# Patient Record
Sex: MALE | URBAN - METROPOLITAN AREA
[De-identification: names, ages, dates, MRNs, and addresses within clinical notes are randomized per-mention and may not be internally consistent; named-entity substitution may affect disease eponyms.]

---

## 2018-06-24 ENCOUNTER — HOSPITAL ENCOUNTER (INPATIENT)
Facility: HOSPITAL | Age: 58
LOS: 2 days | Discharge: HOME | DRG: 246 | End: 2018-06-26
Attending: EMERGENCY MEDICINE | Admitting: INTERNAL MEDICINE
Payer: COMMERCIAL

## 2018-06-24 ENCOUNTER — APPOINTMENT (EMERGENCY)
Dept: NON INVASIVE DIAGNOSTICS | Facility: HOSPITAL | Age: 58
DRG: 246 | End: 2018-06-24
Attending: INTERNAL MEDICINE
Payer: COMMERCIAL

## 2018-06-24 ENCOUNTER — APPOINTMENT (EMERGENCY)
Dept: RADIOLOGY | Facility: HOSPITAL | Age: 58
DRG: 246 | End: 2018-06-24
Payer: COMMERCIAL

## 2018-06-24 DIAGNOSIS — I21.9 ACUTE MYOCARDIAL INFARCTION (HCC): ICD-10-CM

## 2018-06-24 DIAGNOSIS — I49.01 VENTRICULAR FIBRILLATION (HCC): ICD-10-CM

## 2018-06-24 DIAGNOSIS — I21.09 ST ELEVATION MYOCARDIAL INFARCTION (STEMI) INVOLVING OTHER CORONARY ARTERY OF ANTERIOR WALL (HCC): Primary | ICD-10-CM

## 2018-06-24 PROBLEM — I21.3 STEMI (ST ELEVATION MYOCARDIAL INFARCTION) (HCC): Status: ACTIVE | Noted: 2018-06-24

## 2018-06-24 LAB
ALBUMIN SERPL BCP-MCNC: 3.9 G/DL (ref 3.5–5)
ALP SERPL-CCNC: 53 U/L (ref 46–116)
ALT SERPL W P-5'-P-CCNC: 29 U/L (ref 12–78)
ANION GAP SERPL CALCULATED.3IONS-SCNC: 10 MMOL/L (ref 4–13)
AST SERPL W P-5'-P-CCNC: 23 U/L (ref 5–45)
BASOPHILS # BLD AUTO: 0.02 THOUSANDS/ΜL (ref 0–0.1)
BASOPHILS NFR BLD AUTO: 0 % (ref 0–1)
BILIRUB SERPL-MCNC: 0.37 MG/DL (ref 0.2–1)
BUN SERPL-MCNC: 22 MG/DL (ref 5–25)
CALCIUM SERPL-MCNC: 8.9 MG/DL (ref 8.3–10.1)
CHLORIDE SERPL-SCNC: 106 MMOL/L (ref 100–108)
CO2 SERPL-SCNC: 25 MMOL/L (ref 21–32)
CREAT SERPL-MCNC: 1.25 MG/DL (ref 0.6–1.3)
EOSINOPHIL # BLD AUTO: 0 THOUSAND/ΜL (ref 0–0.61)
EOSINOPHIL NFR BLD AUTO: 0 % (ref 0–6)
ERYTHROCYTE [DISTWIDTH] IN BLOOD BY AUTOMATED COUNT: 13.2 % (ref 11.6–15.1)
GFR SERPL CREATININE-BSD FRML MDRD: 64 ML/MIN/1.73SQ M
GLUCOSE SERPL-MCNC: 107 MG/DL (ref 65–140)
HCT VFR BLD AUTO: 41.9 % (ref 36.5–49.3)
HGB BLD-MCNC: 14.4 G/DL (ref 12–17)
LYMPHOCYTES # BLD AUTO: 1.09 THOUSANDS/ΜL (ref 0.6–4.47)
LYMPHOCYTES NFR BLD AUTO: 10 % (ref 14–44)
MCH RBC QN AUTO: 30.7 PG (ref 26.8–34.3)
MCHC RBC AUTO-ENTMCNC: 34.4 G/DL (ref 31.4–37.4)
MCV RBC AUTO: 89 FL (ref 82–98)
MONOCYTES # BLD AUTO: 0.86 THOUSAND/ΜL (ref 0.17–1.22)
MONOCYTES NFR BLD AUTO: 8 % (ref 4–12)
NEUTROPHILS # BLD AUTO: 9.55 THOUSANDS/ΜL (ref 1.85–7.62)
NEUTS SEG NFR BLD AUTO: 83 % (ref 43–75)
NRBC BLD AUTO-RTO: 0 /100 WBCS
PLATELET # BLD AUTO: 232 THOUSANDS/UL (ref 149–390)
PMV BLD AUTO: 11.5 FL (ref 8.9–12.7)
POTASSIUM SERPL-SCNC: 3.8 MMOL/L (ref 3.5–5.3)
PROT SERPL-MCNC: 6.7 G/DL (ref 6.4–8.2)
RBC # BLD AUTO: 4.69 MILLION/UL (ref 3.88–5.62)
SODIUM SERPL-SCNC: 141 MMOL/L (ref 136–145)
TROPONIN I SERPL-MCNC: 0.17 NG/ML
WBC # BLD AUTO: 11.52 THOUSAND/UL (ref 4.31–10.16)

## 2018-06-24 PROCEDURE — 99152 MOD SED SAME PHYS/QHP 5/>YRS: CPT | Performed by: INTERNAL MEDICINE

## 2018-06-24 PROCEDURE — C9606 PERC D-E COR REVASC W AMI S: HCPCS | Performed by: INTERNAL MEDICINE

## 2018-06-24 PROCEDURE — C1725 CATH, TRANSLUMIN NON-LASER: HCPCS | Performed by: INTERNAL MEDICINE

## 2018-06-24 PROCEDURE — 36415 COLL VENOUS BLD VENIPUNCTURE: CPT | Performed by: EMERGENCY MEDICINE

## 2018-06-24 PROCEDURE — C1894 INTRO/SHEATH, NON-LASER: HCPCS | Performed by: INTERNAL MEDICINE

## 2018-06-24 PROCEDURE — 99233 SBSQ HOSP IP/OBS HIGH 50: CPT | Performed by: NURSE PRACTITIONER

## 2018-06-24 PROCEDURE — C1887 CATHETER, GUIDING: HCPCS | Performed by: INTERNAL MEDICINE

## 2018-06-24 PROCEDURE — 71045 X-RAY EXAM CHEST 1 VIEW: CPT

## 2018-06-24 PROCEDURE — 92950 HEART/LUNG RESUSCITATION CPR: CPT

## 2018-06-24 PROCEDURE — C1874 STENT, COATED/COV W/DEL SYS: HCPCS

## 2018-06-24 PROCEDURE — 85025 COMPLETE CBC W/AUTO DIFF WBC: CPT | Performed by: EMERGENCY MEDICINE

## 2018-06-24 PROCEDURE — 84484 ASSAY OF TROPONIN QUANT: CPT | Performed by: EMERGENCY MEDICINE

## 2018-06-24 PROCEDURE — 96372 THER/PROPH/DIAG INJ SC/IM: CPT

## 2018-06-24 PROCEDURE — 96376 TX/PRO/DX INJ SAME DRUG ADON: CPT

## 2018-06-24 PROCEDURE — 93454 CORONARY ARTERY ANGIO S&I: CPT | Performed by: INTERNAL MEDICINE

## 2018-06-24 PROCEDURE — 80053 COMPREHEN METABOLIC PANEL: CPT | Performed by: EMERGENCY MEDICINE

## 2018-06-24 PROCEDURE — 96365 THER/PROPH/DIAG IV INF INIT: CPT

## 2018-06-24 PROCEDURE — 93005 ELECTROCARDIOGRAM TRACING: CPT

## 2018-06-24 PROCEDURE — 96368 THER/DIAG CONCURRENT INF: CPT

## 2018-06-24 PROCEDURE — 5A12012 PERFORMANCE OF CARDIAC OUTPUT, SINGLE, MANUAL: ICD-10-PCS | Performed by: EMERGENCY MEDICINE

## 2018-06-24 PROCEDURE — 5A2204Z RESTORATION OF CARDIAC RHYTHM, SINGLE: ICD-10-PCS | Performed by: EMERGENCY MEDICINE

## 2018-06-24 PROCEDURE — 92941 PRQ TRLML REVSC TOT OCCL AMI: CPT | Performed by: INTERNAL MEDICINE

## 2018-06-24 PROCEDURE — 027035Z DILATION OF CORONARY ARTERY, ONE ARTERY WITH TWO DRUG-ELUTING INTRALUMINAL DEVICES, PERCUTANEOUS APPROACH: ICD-10-PCS | Performed by: INTERNAL MEDICINE

## 2018-06-24 PROCEDURE — 99291 CRITICAL CARE FIRST HOUR: CPT

## 2018-06-24 PROCEDURE — B2111ZZ FLUOROSCOPY OF MULTIPLE CORONARY ARTERIES USING LOW OSMOLAR CONTRAST: ICD-10-PCS | Performed by: INTERNAL MEDICINE

## 2018-06-24 PROCEDURE — C1769 GUIDE WIRE: HCPCS | Performed by: INTERNAL MEDICINE

## 2018-06-24 PROCEDURE — 99153 MOD SED SAME PHYS/QHP EA: CPT | Performed by: INTERNAL MEDICINE

## 2018-06-24 PROCEDURE — 96375 TX/PRO/DX INJ NEW DRUG ADDON: CPT

## 2018-06-24 RX ORDER — MAGNESIUM SULFATE 500 MG/ML
VIAL (ML) INJECTION CODE/TRAUMA/SEDATION MEDICATION
Status: COMPLETED | OUTPATIENT
Start: 2018-06-24 | End: 2018-06-24

## 2018-06-24 RX ORDER — EPINEPHRINE 0.1 MG/ML
SYRINGE (ML) INJECTION CODE/TRAUMA/SEDATION MEDICATION
Status: COMPLETED | OUTPATIENT
Start: 2018-06-24 | End: 2018-06-24

## 2018-06-24 RX ORDER — ATORVASTATIN CALCIUM 40 MG/1
80 TABLET, FILM COATED ORAL EVERY EVENING
Status: DISCONTINUED | OUTPATIENT
Start: 2018-06-24 | End: 2018-06-26 | Stop reason: HOSPADM

## 2018-06-24 RX ORDER — DOPAMINE HYDROCHLORIDE 160 MG/100ML
INJECTION, SOLUTION INTRAVENOUS
Status: COMPLETED | OUTPATIENT
Start: 2018-06-24 | End: 2018-06-24

## 2018-06-24 RX ORDER — SODIUM CHLORIDE 9 MG/ML
INJECTION, SOLUTION INTRAVENOUS
Status: COMPLETED | OUTPATIENT
Start: 2018-06-24 | End: 2018-06-24

## 2018-06-24 RX ORDER — ASPIRIN 325 MG
325 TABLET, DELAYED RELEASE (ENTERIC COATED) ORAL DAILY
Status: DISCONTINUED | OUTPATIENT
Start: 2018-06-24 | End: 2018-06-26 | Stop reason: HOSPADM

## 2018-06-24 RX ORDER — FENTANYL CITRATE 50 UG/ML
INJECTION, SOLUTION INTRAMUSCULAR; INTRAVENOUS CODE/TRAUMA/SEDATION MEDICATION
Status: COMPLETED | OUTPATIENT
Start: 2018-06-24 | End: 2018-06-24

## 2018-06-24 RX ORDER — LIDOCAINE HYDROCHLORIDE 10 MG/ML
INJECTION, SOLUTION INFILTRATION; PERINEURAL CODE/TRAUMA/SEDATION MEDICATION
Status: COMPLETED | OUTPATIENT
Start: 2018-06-24 | End: 2018-06-24

## 2018-06-24 RX ORDER — SODIUM CHLORIDE 9 MG/ML
125 INJECTION, SOLUTION INTRAVENOUS CONTINUOUS
Status: DISCONTINUED | OUTPATIENT
Start: 2018-06-24 | End: 2018-06-26 | Stop reason: HOSPADM

## 2018-06-24 RX ORDER — LIDOCAINE HYDROCHLORIDE 20 MG/ML
INJECTION, SOLUTION EPIDURAL; INFILTRATION; INTRACAUDAL; PERINEURAL CODE/TRAUMA/SEDATION MEDICATION
Status: COMPLETED | OUTPATIENT
Start: 2018-06-24 | End: 2018-06-24

## 2018-06-24 RX ORDER — ATROPINE SULFATE 0.1 MG/ML
INJECTION, SOLUTION ENDOTRACHEAL; INTRAMUSCULAR; INTRAVENOUS; SUBCUTANEOUS CODE/TRAUMA/SEDATION MEDICATION
Status: COMPLETED | OUTPATIENT
Start: 2018-06-24 | End: 2018-06-24

## 2018-06-24 RX ORDER — ASPIRIN 325 MG
325 TABLET ORAL ONCE
Status: COMPLETED | OUTPATIENT
Start: 2018-06-25 | End: 2018-06-24

## 2018-06-24 RX ORDER — EPTIFIBATIDE 20 MG/10ML
INJECTION INTRAVENOUS CODE/TRAUMA/SEDATION MEDICATION
Status: COMPLETED | OUTPATIENT
Start: 2018-06-24 | End: 2018-06-24

## 2018-06-24 RX ORDER — HEPARIN SODIUM 5000 [USP'U]/ML
4000 INJECTION, SOLUTION INTRAVENOUS; SUBCUTANEOUS ONCE
Status: COMPLETED | OUTPATIENT
Start: 2018-06-24 | End: 2018-06-24

## 2018-06-24 RX ORDER — MIDAZOLAM HYDROCHLORIDE 1 MG/ML
INJECTION INTRAMUSCULAR; INTRAVENOUS CODE/TRAUMA/SEDATION MEDICATION
Status: COMPLETED | OUTPATIENT
Start: 2018-06-24 | End: 2018-06-24

## 2018-06-24 RX ADMIN — SODIUM CHLORIDE 125 ML/HR: 0.9 INJECTION, SOLUTION INTRAVENOUS at 15:05

## 2018-06-24 RX ADMIN — TICAGRELOR 90 MG: 90 TABLET ORAL at 12:47

## 2018-06-24 RX ADMIN — ATORVASTATIN CALCIUM 80 MG: 40 TABLET, FILM COATED ORAL at 18:39

## 2018-06-24 RX ADMIN — TICAGRELOR 90 MG: 90 TABLET ORAL at 13:04

## 2018-06-24 RX ADMIN — ATROPINE SULFATE 1 MG: 0.1 INJECTION, SOLUTION ENDOTRACHEAL; INTRAMUSCULAR; INTRAVENOUS; SUBCUTANEOUS at 13:43

## 2018-06-24 RX ADMIN — MIDAZOLAM 2 MG: 1 INJECTION INTRAMUSCULAR; INTRAVENOUS at 13:03

## 2018-06-24 RX ADMIN — FENTANYL CITRATE 50 MCG: 50 INJECTION, SOLUTION INTRAMUSCULAR; INTRAVENOUS at 13:54

## 2018-06-24 RX ADMIN — LIDOCAINE HYDROCHLORIDE 100 MG: 20 INJECTION, SOLUTION EPIDURAL; INFILTRATION; INTRACAUDAL; PERINEURAL at 12:31

## 2018-06-24 RX ADMIN — LIDOCAINE HYDROCHLORIDE 5 ML: 10 INJECTION, SOLUTION INFILTRATION; PERINEURAL at 13:05

## 2018-06-24 RX ADMIN — DOPAMINE HYDROCHLORIDE 5 MCG/KG/MIN: 160 INJECTION, SOLUTION INTRAVENOUS at 13:37

## 2018-06-24 RX ADMIN — EPTIFIBATIDE 7.3 ML: 2 INJECTION, SOLUTION INTRAVENOUS at 13:32

## 2018-06-24 RX ADMIN — FENTANYL CITRATE 50 MCG: 50 INJECTION, SOLUTION INTRAMUSCULAR; INTRAVENOUS at 13:02

## 2018-06-24 RX ADMIN — SODIUM CHLORIDE 200 ML/HR: 9 INJECTION, SOLUTION INTRAVENOUS at 13:29

## 2018-06-24 RX ADMIN — HEPARIN SODIUM 4000 UNITS: 5000 INJECTION, SOLUTION INTRAVENOUS; SUBCUTANEOUS at 12:34

## 2018-06-24 RX ADMIN — Medication 1.75 MG/KG/HR: at 13:17

## 2018-06-24 RX ADMIN — MAGNESIUM SULFATE HEPTAHYDRATE 2 G: 500 INJECTION, SOLUTION INTRAMUSCULAR; INTRAVENOUS at 12:33

## 2018-06-24 RX ADMIN — ASPIRIN 325 MG: 325 TABLET ORAL at 23:39

## 2018-06-24 RX ADMIN — TICAGRELOR 90 MG: 90 TABLET ORAL at 18:40

## 2018-06-24 RX ADMIN — ASPIRIN 325 MG: 325 TABLET, COATED ORAL at 15:37

## 2018-06-24 RX ADMIN — IOHEXOL 250 ML: 350 INJECTION, SOLUTION INTRAVENOUS at 13:56

## 2018-06-24 RX ADMIN — SODIUM CHLORIDE 125 ML/HR: 0.9 INJECTION, SOLUTION INTRAVENOUS at 22:01

## 2018-06-24 RX ADMIN — EPINEPHRINE 1 MG: 0.1 INJECTION, SOLUTION ENDOTRACHEAL; INTRACARDIAC; INTRAVENOUS at 12:26

## 2018-06-24 NOTE — CONSULTS
[facility] 56 y o  male MRN: [id number]  Unit/Bed#: ICU 07 Encounter: [id number]    Physician Requesting Consult: Dr Ned Trujilloer  Reason for Consult: Post V-Fib arrest medical management    Assessment/Plan:  1  Acute ST-elevation myocardial infarction with VFib arrest   · Status post ACLS, status post cardiac catheterization with stenting x2 to the ramus  Continue aspirin, Lipitor, Brilinta  Echocardiogram ordered  Continuous cardiopulmonary monitoring  Monitor troponins  Monitor right groin puncture site  Critical Care Time:   Documented critical care time excludes any procedures documented elsewhere  It also excludes any family updates    _____________________________________________________________________      HPI:    Ximena Fortune is a 56 y o  male who presents with acute myocardial infarction  Patient raced bicycles in his younger days but had not raced for 14 years  He returned to bicycle racing today and is visiting [place] secondary to a bicycle race  Post rates the patient had chest discomfort that did not resolve  He rated a 6/10 chest pain associated with diaphoresis, nausea, lightheadedness and dizziness  His friends convinced him to present to the emergency department [facility] for further evaluation treatment  Upon arrival to the emergency department any key G demonstrated a ST-elevation myocardial infarction pattern, and MI alert was called  After brief  The patient had a sudden change of mental status and developed ventricular fibrillation  ACLS protocol was initiated  He received 1 shock at 200 joules and this 1 mg of epinephrine IV with return of spontaneous circulation  He had subsequent runs of V-tach and was given lidocaine and magnesium  Upon my arrival to the emergency department patient was normal sinus rhythm  He continues to complain of 6/10 chest pain  He received 90 mg of Brilinta p o    Patient's blood pressure and rhythm was stable and therefore he was deemed fit to transport to the cardiac catheterization lab  Transport to the lab was uneventful and the patient was handed off to [address] [place] [address]  In occlusion of the ramus was found to stents were placed and the patient was returned to the intensive care unit for further monitoring  Patient is pain-free at this time without complaints  He is hemodynamically stable  His right groin insertion site is covered with a clean dry intact dressing  There is no evidence of hematoma, swelling or redness  He denies complaints of shortness of breath, nausea vomiting or dizziness  He denies abdominal pain  He denies lightheadedness or dizziness  He offers no further complaints    Review of Systems:    Full review of systems was performed  Aside from what was mentioned in the HPI, it is otherwise negative  Historical Information   History reviewed  No pertinent past medical history  Past Surgical History:   Procedure Laterality Date    HERNIA REPAIR       Social History   History   Alcohol Use    Yes     Comment: occasional     History   Drug Use No     History   Smoking Status    Never Smoker   Smokeless Tobacco    Never Used       Family History:   History reviewed  No pertinent family history  Medications:  Pertinent medications were reviewed    Current Facility-Administered Medications:  aspirin 325 mg Oral Daily Marti Robles MD    [START ON 6/25/2018] aspirin 325 mg Oral Once Marti Robles MD    atorvastatin 80 mg Oral QPM Marti Robles MD    sodium chloride 125 mL/hr Intravenous Continuous Marti Robles MD Last Rate: 125 mL/hr (06/24/18 1505)   ticagrelor 90 mg Oral BID Marti Robles MD          No Known Allergies      Vitals:   BP 96/78   Pulse 66   Temp 98 °F (36 7 °C) (Temporal)   Resp 18   Wt 88 6 kg (195 lb 5 2 oz)   SpO2 99%   There is no height or weight on file to calculate BMI    SpO2: 99 %,    ,   O2 Device: Nasal cannula      Intake/Output Summary (Last 24 hours) at 06/24/18 1543  Last data filed at 06/24/18 1505   Gross per 24 hour   Intake             1900 ml   Output                0 ml   Net             1900 ml     Invasive Devices     Peripheral Intravenous Line            Peripheral IV 06/24/18 Left Antecubital less than 1 day                Physical Exam:  Gen:  Pleasant and cooperative  HEENT:  Atraumatic normocephalic pupils equal round reactive to light extraocular muscles intact sclerae anicteric oral mucosa is pink and moist  Neck:  Supple no JVD no lymphadenopathy  Chest:  Clear to auscultation bilaterally respirations even and nonlabored  Cor:  Regular rate and rhythm no murmurs rubs or gallops appreciated  Abd:  Soft nontender with positive bowel sounds  Ext:  No clubbing cyanosis or edema  Neuro:  Alert and oriented x3 moves all extremities  Skin:  Warm dry and intact      Diagnostic Data:  Lab: I have personally reviewed pertinent lab results  ,   CBC:    Results from last 7 days  Lab Units 06/24/18  1220   WBC Thousand/uL 11 52*   HEMOGLOBIN g/dL 14 4   HEMATOCRIT % 41 9   PLATELETS Thousands/uL 232      CMP: Lab Results   Component Value Date     06/24/2018    K 3 8 06/24/2018     06/24/2018    CO2 25 06/24/2018    ANIONGAP 10 06/24/2018    BUN 22 06/24/2018    CREATININE 1 25 06/24/2018    GLUCOSE 107 06/24/2018    CALCIUM 8 9 06/24/2018    AST 23 06/24/2018    ALT 29 06/24/2018    ALKPHOS 53 06/24/2018    PROT 6 7 06/24/2018    BILITOT 0 37 06/24/2018    EGFR 64 06/24/2018   ,   PT/INR: No results found for: PT, INR,   Magnesium: No results found for: MAG,  Phosphorous: No results found for: PHOS    ABG: No results found for: PHART, DCV4BTX, PO2ART, DYL2QAM, B1WPRESW, BEART, SOURCE,     Microbiology:  No pending microbiology    Imaging: I have personally reviewed the pertinent imaging studies on the PACS system  Chest x-ray demonstrates no acute cardiopulmonary disease    Cardiac/EKG/telemetry/Echo:     Normal sinus rhythm      VTE Prophylaxis: Sequential compression device (Venodyne)  and Heparin    Code Status: Level 1 - Full Code    EZEQUIEL Collins    Portions of the record may have been created with voice recognition software  Occasional wrong word or "sound a like" substitutions may have occurred due to the inherent limitations of voice recognition software  Read the chart carefully and recognize, using context, where substitutions have occurred

## 2018-06-24 NOTE — H&P
H&P Exam - Cardiology   Jeannie Patel 56 y o  male MRN: [id number]  Unit/Bed#: TOM Encounter: [id number]    Assessment/Plan     AssessmentPlan:  1  Acute lateral wall myocardial infarction  Of this was confirmed by cardiac catheterization  There was total occlusion of a ramus intermedius with thrombus present  This was stented with 2 5 and 2 75 Xience drug-eluting stents to a maximum diameter of 3 mm with an excellent result  The patient is recommended to have dual anti-platelet therapy for 1 year and tight character lower and aspirin are ordered  A 2D echocardiogram is to be checked for left ventricular systolic function  He did have a soft systolic murmur and calcification of the aortic leaflets were noted on catheterization  The ascending  aorta also seen mildly enlarged based on catheter movement and I would recommend a CT scan of the chest at some point to rule out ascending aortic aneurysm  Fasting lipid profile will be checked and he will be started on atorvastatin  Beta-blockers will be held at this point secondary to relatively low blood pressure  This will be reassessed in the morning  History of Present Illness   HPI:  Jeannie Patel is a 56 y o  male who presents with acute substernal chest pain following a bike race  In the ED ST segment elevation noted in leads 1 and aVL and MI alert called  Upon arrival to cath lab the patient had continued 5 of 10 substernal chest pain  Historical Information   History reviewed  No pertinent past medical history  Past Surgical History:   Procedure Laterality Date    HERNIA REPAIR       Social History   History   Alcohol Use    Yes     Comment: occasional     History   Drug Use No     History   Smoking Status    Never Smoker   Smokeless Tobacco    Never Used     Family History: History reviewed  No pertinent family history      Meds/Allergies   all medications and allergies reviewed  No Known Allergies    Objective   Vitals: Blood pressure 96/70, pulse 69, temperature 97 5 °F (36 4 °C), temperature source Oral, resp  rate 18, weight 83 9 kg (185 lb), SpO2 98 %  Orthostatic Blood Pressures      Most Recent Value   Blood Pressure  96/70 filed at 06/24/2018 1248   Patient Position - Orthostatic VS  Lying filed at 06/24/2018 1213            Intake/Output Summary (Last 24 hours) at 06/24/18 1408  Last data filed at 06/24/18 1405   Gross per 24 hour   Intake             1700 ml   Output                0 ml   Net             1700 ml       Invasive Devices     Peripheral Intravenous Line            Peripheral IV 06/24/18 Left Antecubital less than 1 day    Peripheral IV 06/24/18 Right Antecubital less than 1 day                Review of Systems:  Review of Systems   Constitutional: Negative for fatigue  HENT: Negative for nosebleeds  Eyes: Negative for redness  Respiratory: Negative for chest tightness and shortness of breath  Cardiovascular: Positive for chest pain  Negative for palpitations and leg swelling  Gastrointestinal: Negative for abdominal pain  Endocrine: Negative for polyuria  Genitourinary: Negative for urgency  Musculoskeletal: Negative for arthralgias  Skin: Negative for rash  Neurological: Negative for dizziness and syncope  Psychiatric/Behavioral: Negative for confusion and sleep disturbance  The patient is not nervous/anxious  Physical Exam   Constitutional: He is oriented to person, place, and time  He appears well-developed and well-nourished  HENT:   Head: Normocephalic and atraumatic  Nose: Nose normal    Mouth/Throat: Oropharynx is clear and moist    Eyes: Pupils are equal, round, and reactive to light  Neck: Neck supple  Cardiovascular: Normal rate, regular rhythm and intact distal pulses  Murmur heard  Systolic murmur is present with a grade of 1/6   Pulmonary/Chest: Effort normal and breath sounds normal    Abdominal: Soft   Bowel sounds are normal    Musculoskeletal: Normal range of motion  Neurological: He is alert and oriented to person, place, and time  Skin: Skin is warm and dry  Psychiatric: He has a normal mood and affect  His behavior is normal  Judgment and thought content normal        Lab Results: I have personally reviewed pertinent lab results  ** Please Note: Fluency DirectDictation voice to text software may have been used in the creation of this document   **

## 2018-06-24 NOTE — BRIEF OP NOTE (RAD/CATH)
Cardiac catheterization    Indications:  MI alert  ST segment elevation 1 and aVL  Findings:  Single-vessel obstructive epicardial coronary artery disease with total acute occlusion of a large ramus intermedius proximally  The left main left anterior descending left circumflex and right coronary artery had mild luminal irregularities with no focal obstruction  The ramus was subsequently stented with a 2 5 by 18 Xience drug-eluting stent that was post dilated with a 2 75 NC balloon and more distally a 2 75 x 8 mm Xience drug-eluting stent was placed  Of the lesion had a significant thrombus with some mild distal embolization  In addition to dual anti platelet therapy and Angiomax the patient was also given double bolus of Integrilin  Following the procedure there was an excellent angiographic result with improvement in flow from AMANDA grade 0 to AMANDA grade 3  Recommendations:  A 2D echocardiogram for left ventricular function  The aortic leaflets were noted to be calcified and the ascending aorta seemed enlarged this can be assessed by echocardiography and if need be a CT scan can be ordered to confirm the size of the ascending aorta  He will be started on statin  Beta-blocker will be held for now secondary to relatively low systolic blood pressure and heart rate and this can be reassessed tomorrow morning    Dual anti-platelet therapy is recommended for 1 year

## 2018-06-24 NOTE — ED NOTES
Patient transported to the cath lab with RN and critical care team  Patient stable throughout transport, sinus rhythm throughout transport  Report given to cath lab team and care transferred        Eber Zavaleta RN  06/24/18 406 Quan Kate RN  06/24/18 0516

## 2018-06-25 ENCOUNTER — APPOINTMENT (INPATIENT)
Dept: NON INVASIVE DIAGNOSTICS | Facility: HOSPITAL | Age: 58
DRG: 246 | End: 2018-06-25
Payer: COMMERCIAL

## 2018-06-25 LAB
ANION GAP SERPL CALCULATED.3IONS-SCNC: 9 MMOL/L (ref 4–13)
ATRIAL RATE: 182 BPM
ATRIAL RATE: 60 BPM
BUN SERPL-MCNC: 12 MG/DL (ref 5–25)
CALCIUM SERPL-MCNC: 8.3 MG/DL (ref 8.3–10.1)
CHLORIDE SERPL-SCNC: 109 MMOL/L (ref 100–108)
CHOLEST SERPL-MCNC: 172 MG/DL (ref 50–200)
CO2 SERPL-SCNC: 24 MMOL/L (ref 21–32)
CREAT SERPL-MCNC: 0.87 MG/DL (ref 0.6–1.3)
ERYTHROCYTE [DISTWIDTH] IN BLOOD BY AUTOMATED COUNT: 13.7 % (ref 11.6–15.1)
GFR SERPL CREATININE-BSD FRML MDRD: 97 ML/MIN/1.73SQ M
GLUCOSE SERPL-MCNC: 102 MG/DL (ref 65–140)
HCT VFR BLD AUTO: 40.1 % (ref 36.5–49.3)
HDLC SERPL-MCNC: 54 MG/DL (ref 40–60)
HGB BLD-MCNC: 13.5 G/DL (ref 12–17)
LDLC SERPL CALC-MCNC: 103 MG/DL (ref 0–100)
MCH RBC QN AUTO: 30.5 PG (ref 26.8–34.3)
MCHC RBC AUTO-ENTMCNC: 33.7 G/DL (ref 31.4–37.4)
MCV RBC AUTO: 91 FL (ref 82–98)
NONHDLC SERPL-MCNC: 118 MG/DL
P AXIS: 69 DEGREES
P AXIS: 72 DEGREES
PLATELET # BLD AUTO: 195 THOUSANDS/UL (ref 149–390)
PMV BLD AUTO: 12 FL (ref 8.9–12.7)
POTASSIUM SERPL-SCNC: 3.7 MMOL/L (ref 3.5–5.3)
PR INTERVAL: 162 MS
QRS AXIS: 268 DEGREES
QRS AXIS: 52 DEGREES
QRSD INTERVAL: 138 MS
QRSD INTERVAL: 92 MS
QT INTERVAL: 288 MS
QT INTERVAL: 414 MS
QTC INTERVAL: 414 MS
QTC INTERVAL: 498 MS
RBC # BLD AUTO: 4.42 MILLION/UL (ref 3.88–5.62)
SODIUM SERPL-SCNC: 142 MMOL/L (ref 136–145)
T WAVE AXIS: -6 DEGREES
T WAVE AXIS: 18 DEGREES
TRIGL SERPL-MCNC: 76 MG/DL
TROPONIN I SERPL-MCNC: >40 NG/ML
TROPONIN I SERPL-MCNC: >40 NG/ML
VENTRICULAR RATE: 180 BPM
VENTRICULAR RATE: 60 BPM
WBC # BLD AUTO: 9.95 THOUSAND/UL (ref 4.31–10.16)

## 2018-06-25 PROCEDURE — 93010 ELECTROCARDIOGRAM REPORT: CPT | Performed by: INTERNAL MEDICINE

## 2018-06-25 PROCEDURE — 85027 COMPLETE CBC AUTOMATED: CPT | Performed by: INTERNAL MEDICINE

## 2018-06-25 PROCEDURE — 80061 LIPID PANEL: CPT | Performed by: INTERNAL MEDICINE

## 2018-06-25 PROCEDURE — 84484 ASSAY OF TROPONIN QUANT: CPT | Performed by: INTERNAL MEDICINE

## 2018-06-25 PROCEDURE — 93306 TTE W/DOPPLER COMPLETE: CPT

## 2018-06-25 PROCEDURE — 99232 SBSQ HOSP IP/OBS MODERATE 35: CPT | Performed by: INTERNAL MEDICINE

## 2018-06-25 PROCEDURE — 80048 BASIC METABOLIC PNL TOTAL CA: CPT | Performed by: INTERNAL MEDICINE

## 2018-06-25 PROCEDURE — 93306 TTE W/DOPPLER COMPLETE: CPT | Performed by: INTERNAL MEDICINE

## 2018-06-25 RX ORDER — ONDANSETRON 2 MG/ML
INJECTION INTRAMUSCULAR; INTRAVENOUS
Status: COMPLETED
Start: 2018-06-25 | End: 2018-06-25

## 2018-06-25 RX ADMIN — ASPIRIN 325 MG: 325 TABLET, COATED ORAL at 09:20

## 2018-06-25 RX ADMIN — ATORVASTATIN CALCIUM 80 MG: 40 TABLET, FILM COATED ORAL at 17:29

## 2018-06-25 RX ADMIN — ONDANSETRON 4 MG: 2 INJECTION INTRAMUSCULAR; INTRAVENOUS at 04:39

## 2018-06-25 RX ADMIN — TICAGRELOR 90 MG: 90 TABLET ORAL at 17:30

## 2018-06-25 RX ADMIN — SODIUM CHLORIDE 125 ML/HR: 0.9 INJECTION, SOLUTION INTRAVENOUS at 05:32

## 2018-06-25 RX ADMIN — TICAGRELOR 90 MG: 90 TABLET ORAL at 09:20

## 2018-06-25 NOTE — CASE MANAGEMENT
Initial Clinical Review    Admission: Date/Time/Statement: 6/24/18 @ 1406     Orders Placed This Encounter   Procedures    Inpatient Admission     Standing Status:   Standing     Number of Occurrences:   1     Order Specific Question:   Admitting Physician     Answer:   John Keith     Order Specific Question:   Level of Care     Answer:   Critical Care [15]     Order Specific Question:   Estimated length of stay     Answer:   More than 2 Midnights     Order Specific Question:   Certification     Answer:   I certify that inpatient services are medically necessary for this patient for a duration of greater than two midnights  See H&P and MD Progress Notes for additional information about the patient's course of treatment  ED: Date/Time/Mode of Arrival:   ED Arrival Information     Expected Arrival Acuity Means of Arrival Escorted By Service Admission Type    - 6/24/2018 12:08 Emergent Walk-In Self Cardiology Emergency    Arrival Complaint    chest pain          Chief Complaint:   Chief Complaint   Patient presents with    Chest Pain     chest pain x 1 hour  completed 24 mile bike race this morning  denies CP during the race  denies nausea  reports dizziness  History of Illness: Joel Ramírez is a 56 y o  male who presents with acute substernal chest pain following a bike race  In the ED ST segment elevation noted in leads 1 and aVL and MI alert called    Upon arrival to cath lab the patient had continued 5 of 10 substernal chest pain        ED Vital Signs:   ED Triage Vitals [06/24/18 1213]   Temperature Pulse Respirations Blood Pressure SpO2   97 5 °F (36 4 °C) 63 18 119/83 98 %      Temp Source Heart Rate Source Patient Position - Orthostatic VS BP Location FiO2 (%)   Oral Monitor Lying Right arm --      Pain Score       6        Wt Readings from Last 1 Encounters:   06/24/18 88 6 kg (195 lb 5 2 oz)       Vital Signs (abnormal):    above    Abnormal Labs/Diagnostic Test Results: Troponin  0 17  WBC   11 52  Abs neutro   9 55  CXR:  NAD    ED Treatment:   Medication Administration from 06/24/2018 1208 to 06/24/2018 1415       Date/Time Order Dose Route Action Action by Comments     06/24/2018 1226 EPINEPHrine (ADRENALIN) 1 mg/10 mL injection 1 mg Intravenous Given Jasper Phoenix RN      06/24/2018 1233 magnesium sulfate 1 g/2 mL injection 2 g Intravenous Given Jasper Phoenix RN      06/24/2018 1247 ticagrelor (BRILINTA) tablet 90 mg 90 mg Oral Given Winnie Roblero RN      06/24/2018 1234 heparin (porcine) subcutaneous injection 4,000 Units 4,000 Units Subcutaneous Given Winnie Roblero RN      06/24/2018 1302 fentanyl citrate (PF) 100 MCG/2ML 50 mcg Intravenous Given Donna Wolf RN      06/24/2018 1303 midazolam (VERSED) injection 2 mg Intravenous Given Donna Wolf RN      06/24/2018 1304 ticagrelor (BRILINTA) tablet 90 mg Oral Given Donna Wolf RN      06/24/2018 1305 lidocaine (XYLOCAINE) 1 % injection 5 mL Infiltration Given Eddie Swanson MD right groin     06/24/2018 1231 lidocaine (PF) (XYLOCAINE-MPF) 100 mg/5 mL injection 100 mg Intravenous Given Jasper Phoenix RN      06/24/2018 1315 bivalirudin (ANGIOMAX) bolus from bag 14 3 mL Intravenous Given Donna Wolf RN      06/24/2018 1400 Bivalirudin Trifluoroacetate (ANGIOMAX) 250 mg in sodium chloride 0 9 % 50 mL infusion 0 mg/kg/hr Intravenous Stopped Remy Fernández RN stopped prior to coming to the floor     06/24/2018 1317 Bivalirudin Trifluoroacetate (ANGIOMAX) 250 mg in sodium chloride 0 9 % 50 mL infusion 1 75 mg/kg/hr Intravenous [address] Donna Wolf RN      06/24/2018 1405 sodium chloride 0 9 % infusion 200 mL/hr  Rate/Dose Change Sarkis Stahl RN      06/24/2018 1329 sodium chloride 0 9 % infusion 200 mL/hr Intravenous New Bag Donna Wolf RN fluid open for sbp 80's     06/24/2018 1332 eptifibatide (INTEGRILIN) 20 MG/10ML (premix) 7 3 mL Intravenous Given Donna Wolf RN 06/24/2018 1354 DOPamine (INTROPIN) 400 mg in 250 mL infusion (premix)   Intravenous Stopped Abdoul Bearded, RN      06/24/2018 1345 DOPamine (INTROPIN) 400 mg in 250 mL infusion (premix) 5 mcg/kg/min Intravenous Rate/Dose Change Abdoul Bearded, RN      06/24/2018 1342 DOPamine (INTROPIN) 400 mg in 250 mL infusion (premix) 10 mcg/kg/min Intravenous Rate/Dose Change Abdoul Bearded, RN      06/24/2018 1337 DOPamine (INTROPIN) 400 mg in 250 mL infusion (premix) 5 mcg/kg/min Intravenous [address] Abdoul Bearded, RN      06/24/2018 1343 atropine 1 mg/10 mL injection 1 mg Intravenous Given Abdoul Bearded, RN      06/24/2018 1354 fentanyl citrate (PF) 100 MCG/2ML 50 mcg Intravenous Given Abdoul Bearded, RN      06/24/2018 1356 iohexol (OMNIPAQUE) 350 MG/ML injection (SINGLE-DOSE) 250 mL Intravenous Given Anam Serrano MD           Past Medical/Surgical History: Active Ambulatory Problems     Diagnosis Date Noted    No Active Ambulatory Problems     Resolved Ambulatory Problems     Diagnosis Date Noted    No Resolved Ambulatory Problems     No Additional Past Medical History       Admitting Diagnosis: Ventricular fibrillation ([facility] [address] ) [I49 01]  Acute myocardial infarction [facility]) [I21 9]  Chest pain [R07 9]    Age/Sex: 56 y o  male    Assessment/Plan: Acute lateral wall myocardial infarction  Of this was confirmed by cardiac catheterization  There was total occlusion of a ramus intermedius with thrombus present  This was stented with 2 5 and 2 75 Xience drug-eluting stents to a maximum diameter of 3 mm with an excellent result  The patient is recommended to have dual anti-platelet therapy for 1 year and tight character lower and aspirin are ordered  A 2D echocardiogram is to be checked for left ventricular systolic function  He did have a soft systolic murmur and calcification of the aortic leaflets were noted on catheterization    The ascending  aorta also seen mildly enlarged based on catheter movement and I would recommend a CT scan of the chest at some point to rule out ascending aortic aneurysm  Fasting lipid profile will be checked and he will be started on atorvastatin  Beta-blockers will be held at this point secondary to relatively low blood pressure  This will be reassessed in the morning        Admission Orders:   IP   6/24  @   1406  Scheduled Meds:   Current Facility-Administered Medications:  aspirin 325 mg Oral Daily Maximino Cyr MD    atorvastatin 80 mg Oral QPM Maximino Cyr MD    sodium chloride 125 mL/hr Intravenous Continuous Maximino Cyr MD Last Rate: 125 mL/hr (06/25/18 0532)   ticagrelor 90 mg Oral BID Maximino Cyr MD      Continuous Infusions:   sodium chloride 125 mL/hr Last Rate: 125 mL/hr (06/25/18 0532)     PRN Meds:     2 De  Cardiac  Diet  Serial troponin  Cons critical care  Cardiac   Cath  6/24    1  Acute ST-elevation myocardial infarction with VFib arrest   · Status post ACLS, status post cardiac catheterization with stenting x2 to the ramus  Continue aspirin, Lipitor, Brilinta  Echocardiogram ordered  Continuous cardiopulmonary monitoring  Monitor troponins  Monitor right groin puncture site  Thank you,  [facility] in the [employer] by Shaquille Olvera for 2017  Network Utilization Review Department  Phone: [phone]; Fax [phone]  ATTENTION: The Network Utilization Review Department is now centralized for our 7 Facilities  Make a note that we have a new phone and fax numbers for our Department  Please call with any questions or concerns to [phone] and carefully follow the prompts so that you are directed to the right person  All voicemails are confidential  Fax any determinations, approvals, denials, and requests for initial or continue stay review clinical to [phone]   Due to HIGH CALL volume, it would be easier if you could please send faxed requests to expedite your requests and in part, help us provide discharge notifications faster

## 2018-06-25 NOTE — PROGRESS NOTES
Progress Note - Cardiology   Joey William 56 y o  male MRN: [id number]  Unit/Bed#: ICU 07 Encounter: [id number]      Assessment/Recommendations/Discussion:     1  Acute lateral wall STEMI--s/p PCI/LIBRADO x 2 ramus (2 5 x 18 mm and 2 5 x 8mm)--good results, AMANDA 3 flow  No residual obstructive disease  2  VF arrest (brief) in ED secondary to above s/p defibrillation and brief CPR x 2 minutes  3  Dyslipidemia  4  AV calcification and ?aortic root dilatation on cath    · Continue dual antiplatelet treatment  · Continue high-dose statin  · Discontinue IV fluids after this current bag  Encouraged hydration today  · Incentive spirometer  · Will keep in ICU today, and transfer tomorrow if all is well  · Echocardiogram pending--if ascending AO poorly visualized, may consider CT chest       Subjective:  Patient seen and examined, feels well, some sharp chest discomfort with deep inspiration  Physical Exam:  GEN:  NAD  HEENT:  MMM, NCAT, pink conjunctiva, EOMI, nonicteric sclera  CV:  NO JVD/HJR, RR, NO M/R/G, +S1/S2, NO PARASTERNAL HEAVE/THRILL, NO LE EDEMA, NO HEPATIC SYSTOLIC PULSATION, WARM EXTREMITIES    Right groin looks clean, dry, intact without hematoma with normal distal pulses  RESP:  CTAB/L  ABD:  SOFT, NT, NO GROSS ORGANOMEGALY        Vitals:   BP 91/60   Pulse (!) 54   Temp 100 °F (37 8 °C) (Temporal)   Resp 21   Wt 88 6 kg (195 lb 5 2 oz)   SpO2 93%   Vitals:    06/24/18 1213 06/24/18 1451   Weight: 83 9 kg (185 lb) 88 6 kg (195 lb 5 2 oz)       Intake/Output Summary (Last 24 hours) at 06/25/18 1029  Last data filed at 06/25/18 0600   Gross per 24 hour   Intake          3760 42 ml   Output              750 ml   Net          3010 42 ml         TELEMETRY:  Sinus rhythm, nonsustained ventricular tachycardia  Lab Results:    Results from last 7 days  Lab Units 06/25/18  0530   WBC Thousand/uL 9 95   HEMOGLOBIN g/dL 13 5   HEMATOCRIT % 40 1   PLATELETS Thousands/uL 195       Results from last 7 days  Lab Units 06/25/18  0530 06/24/18  1220   SODIUM mmol/L 142 141   POTASSIUM mmol/L 3 7 3 8   CHLORIDE mmol/L 109* 106   CO2 mmol/L 24 25   BUN mg/dL 12 22   CREATININE mg/dL 0 87 1 25   CALCIUM mg/dL 8 3 8 9   TOTAL PROTEIN g/dL  --  6 7   BILIRUBIN TOTAL mg/dL  --  0 37   ALK PHOS U/L  --  53   ALT U/L  --  29   AST U/L  --  23   GLUCOSE RANDOM mg/dL 102 107       Results from last 7 days  Lab Units 06/25/18  0530   SODIUM mmol/L 142   POTASSIUM mmol/L 3 7   CHLORIDE mmol/L 109*   CO2 mmol/L 24   BUN mg/dL 12   CREATININE mg/dL 0 87   GLUCOSE RANDOM mg/dL 102   CALCIUM mg/dL 8 3       Results from last 7 days  Lab Units 06/25/18  0530   CHOLESTEROL mg/dL 172         Medications:    Current Facility-Administered Medications:     aspirin (ECOTRIN) EC tablet 325 mg, 325 mg, Oral, Daily, Ute Dangelo MD, 325 mg at 06/25/18 0920    atorvastatin (LIPITOR) tablet 80 mg, 80 mg, Oral, QPM, Ute Dangelo MD, 80 mg at 06/24/18 1839    sodium chloride 0 9 % infusion, 125 mL/hr, Intravenous, Continuous, Ute Dangelo MD, Last Rate: 125 mL/hr at 06/25/18 0532, 125 mL/hr at 06/25/18 0532    ticagrelor (BRILINTA) tablet 90 mg, 90 mg, Oral, BID, Ute Dangelo MD, 90 mg at 06/25/18 0920    Portions of the record may have been created with voice recognition software  Occasional wrong word or "sound a like" substitutions may have occurred due to the inherent limitations of voice recognition software  Read the chart carefully and recognize, using context, where substitutions have occurred

## 2018-06-25 NOTE — PLAN OF CARE

## 2018-06-25 NOTE — SOCIAL WORK
ADRIAN met with the patient and his wife to do a general SW assessment  The patient lives at home with his wife and two teenage children  He is independent with adls and ambulation  He drives  He is employed  No hx of VNA or STR needs in the past  He is an active cyclist      We did not have his insurance on file, he does not have his card but his wife had a statement in her purse Yuval Vaughan Mal warren  Member# [id number]  Group# [id number]  Telephone nUmber: [phone]  ADRIAN provided this information to  as well as the business office and financial counselor  Wife is [place] [phone]  Adrian does not anticipate any discharge needs

## 2018-06-26 ENCOUNTER — APPOINTMENT (INPATIENT)
Dept: CT IMAGING | Facility: HOSPITAL | Age: 58
DRG: 246 | End: 2018-06-26
Payer: COMMERCIAL

## 2018-06-26 VITALS
TEMPERATURE: 98.7 F | WEIGHT: 195.33 LBS | RESPIRATION RATE: 27 BRPM | DIASTOLIC BLOOD PRESSURE: 85 MMHG | HEART RATE: 66 BPM | SYSTOLIC BLOOD PRESSURE: 108 MMHG | OXYGEN SATURATION: 96 %

## 2018-06-26 PROBLEM — I77.810 AORTIC ROOT DILATATION (HCC): Status: ACTIVE | Noted: 2018-06-26

## 2018-06-26 PROBLEM — Q23.1 BICUSPID AORTIC VALVE: Status: ACTIVE | Noted: 2018-06-26

## 2018-06-26 PROBLEM — I49.01 VF (VENTRICULAR FIBRILLATION) (HCC): Status: ACTIVE | Noted: 2018-06-26

## 2018-06-26 PROBLEM — I25.10 CORONARY ARTERY DISEASE INVOLVING NATIVE CORONARY ARTERY OF NATIVE HEART: Chronic | Status: ACTIVE | Noted: 2018-06-26

## 2018-06-26 LAB
ANION GAP SERPL CALCULATED.3IONS-SCNC: 9 MMOL/L (ref 4–13)
BUN SERPL-MCNC: 11 MG/DL (ref 5–25)
CALCIUM SERPL-MCNC: 8 MG/DL (ref 8.3–10.1)
CHLORIDE SERPL-SCNC: 109 MMOL/L (ref 100–108)
CO2 SERPL-SCNC: 24 MMOL/L (ref 21–32)
CREAT SERPL-MCNC: 0.99 MG/DL (ref 0.6–1.3)
GFR SERPL CREATININE-BSD FRML MDRD: 85 ML/MIN/1.73SQ M
GLUCOSE SERPL-MCNC: 90 MG/DL (ref 65–140)
NT-PROBNP SERPL-MCNC: 1958 PG/ML
POTASSIUM SERPL-SCNC: 4.1 MMOL/L (ref 3.5–5.3)
SODIUM SERPL-SCNC: 142 MMOL/L (ref 136–145)

## 2018-06-26 PROCEDURE — 80048 BASIC METABOLIC PNL TOTAL CA: CPT | Performed by: INTERNAL MEDICINE

## 2018-06-26 PROCEDURE — 83880 ASSAY OF NATRIURETIC PEPTIDE: CPT | Performed by: INTERNAL MEDICINE

## 2018-06-26 PROCEDURE — 71260 CT THORAX DX C+: CPT

## 2018-06-26 PROCEDURE — 99238 HOSP IP/OBS DSCHRG MGMT 30/<: CPT | Performed by: INTERNAL MEDICINE

## 2018-06-26 RX ORDER — NITROGLYCERIN 0.4 MG/1
0.4 TABLET SUBLINGUAL
Qty: 45 TABLET | Refills: 1 | Status: SHIPPED | OUTPATIENT
Start: 2018-06-26

## 2018-06-26 RX ORDER — ATORVASTATIN CALCIUM 80 MG/1
80 TABLET, FILM COATED ORAL DAILY
Qty: 30 TABLET | Refills: 11 | Status: SHIPPED | OUTPATIENT
Start: 2018-06-26

## 2018-06-26 RX ORDER — ASPIRIN 81 MG/1
81 TABLET ORAL DAILY
Qty: 30 TABLET | Refills: 0
Start: 2018-06-26

## 2018-06-26 RX ADMIN — IOHEXOL 85 ML: 350 INJECTION, SOLUTION INTRAVENOUS at 10:43

## 2018-06-26 RX ADMIN — ATORVASTATIN CALCIUM 80 MG: 40 TABLET, FILM COATED ORAL at 15:44

## 2018-06-26 RX ADMIN — TICAGRELOR 90 MG: 90 TABLET ORAL at 08:36

## 2018-06-26 RX ADMIN — TICAGRELOR 90 MG: 90 TABLET ORAL at 15:44

## 2018-06-26 RX ADMIN — ASPIRIN 325 MG: 325 TABLET, COATED ORAL at 08:36
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
## 2018-06-26 NOTE — DISCHARGE INSTRUCTIONS
Myocardial Infarction   WHAT YOU NEED TO KNOW:   A myocardial infarction (MI) is a heart attack  A heart attack happens when the blood vessels that supply blood to your heart (coronary arteries) are blocked  This can damage your heart  It can lead to an abnormal heart rhythm, heart failure, or may become life-threatening  DISCHARGE INSTRUCTIONS:   Medicines: You may  need any of the following:  · Heart medicines  help decrease blood pressure, control your heart rate, and help your heart function better  · Nitroglycerin  opens the arteries to your heart, increases oxygen levels, and can decrease chest pain  You may get your nitroglycerin as a pill, a patch, or a paste  Ask your healthcare provider or cardiologist how to safely take this medicine  · Aspirin  helps prevent clots from forming and causing blood flow problems  If healthcare providers want you to take aspirin daily, do not take acetaminophen or ibuprofen instead  Do not take more or less aspirin than healthcare providers say to take  If you are on another blood thinner medicine, ask your healthcare provider or cardiologist before you take aspirin for any reason  · Blood thinners    help prevent blood clots  Examples of blood thinners include heparin and warfarin  Clots can cause strokes, heart attacks, and death  The following are general safety guidelines to follow while you are taking a blood thinner:    ¨ Watch for bleeding and bruising while you take blood thinners  Watch for bleeding from your gums or nose  Watch for blood in your urine and bowel movements  Use a soft washcloth on your skin, and a soft toothbrush to brush your teeth  This can keep your skin and gums from bleeding  If you shave, use an electric shaver  Do not play contact sports  ¨ Tell your dentist and other healthcare providers that you take anticoagulants  Wear a bracelet or necklace that says you take this medicine       ¨ Do not start or stop any medicines unless your healthcare provider tells you to  Many medicines cannot be used with blood thinners  ¨ Tell your healthcare provider right away if you forget to take the medicine, or if you take too much  ¨ Warfarin  is a blood thinner that you may need to take  The following are things you should be aware of if you take warfarin  § Foods and medicines can affect the amount of warfarin in your blood  Do not make major changes to your diet while you take warfarin  Warfarin works best when you eat about the same amount of vitamin K every day  Vitamin K is found in green leafy vegetables and certain other foods  Ask for more information about what to eat when you are taking warfarin  § You will need to see your healthcare provider for follow-up visits when you are on warfarin  You will need regular blood tests  These tests are used to decide how much medicine you need  · Cholesterol medicine  decreases cholesterol and the amount of plaque in your blood  · Do not take certain medicines without asking your healthcare provider first   These include NSAIDs, herbal or vitamin supplements, or hormones (estrogen or progestin)  · Take your medicine as directed  Contact your healthcare provider if you think your medicine is not helping or if you have side effects  Tell him or her if you are allergic to any medicine  Keep a list of the medicines, vitamins, and herbs you take  Include the amounts, and when and why you take them  Bring the list or the pill bottles to follow-up visits  Carry your medicine list with you in case of an emergency  Cardiac rehabilitation (rehab)  is a program run by specialists who will help you safely strengthen your heart and prevent more heart disease  The plan includes exercise, relaxation, stress management, and heart-healthy nutrition  Healthcare providers will also check to make sure any medicines you take are working   The plan may also include instructions for when you can drive, return to work, and do other normal daily activities  Follow up with your healthcare provider or cardiologist within 14 days or as directed:  Ask for information about continuing care, treatments, and home services  Write down your questions so you remember to ask them during your visits  Lifestyle changes:   · Go to cardiac rehabilitation as directed  This is a program run by specialists who will help you safely strengthen your heart and prevent more heart disease  This plan includes exercise, relaxation, stress management, and heart-healthy nutrition  Healthcare providers will also check to make sure any medicines you are taking are working  The plan may also include instructions for when you can drive, return to work, and do other normal daily activities  · Eat a heart healthy diet  Get enough calories, protein, vitamins, and minerals to help prevent poor nutrition and promote muscle strength  You may be told to eat foods low in cholesterol or sodium (salt)  You also may be told to limit saturated and trans fats  Eat foods that contain healthy fats, such as walnuts, salmon, and canola and soybean oils  Eat foods that help protect the heart, including plenty of fruits and vegetables, nuts, and sources of fiber  · Do not smoke  If you smoke, it is never too late to quit  Smoking increases your risk of another MI      · Exercise  Ask your healthcare provider or cardiologist about the best exercise plan for you  Exercise makes your heart stronger, lowers blood pressure, and helps prevent an MI  The goal is 30 to 60 minutes a day, 5 to 7 days a week  Ask your healthcare provider or cardiologist how often and how long to exercise  · Maintain a healthy weight  Ask your healthcare provider or cardiologist how much you should weigh  Ask him to help you create a weight loss plan if you are overweight  · Manage your stress  Stress may slow healing and lead to illness   Learn ways to control stress, such as relaxation, deep breathing, and music  Talk to someone about things that upset you  · Get a flu vaccine  every year as soon as it is available  The vaccine will help prevent the flu  Ask about other vaccinations you may need  Contact your healthcare provider or cardiologist if:   · You have trouble taking your heart medicine  · You have questions or concerns about your condition or care  Seek care immediately or call 911 if:   · You have any of the following signs of a heart attack:      ¨ Squeezing, pressure, or pain in your chest that lasts longer than 5 minutes or returns    ¨ Discomfort or pain in your back, neck, jaw, stomach, or arm     ¨ Trouble breathing    ¨ Nausea or vomiting    ¨ Lightheadedness or a sudden cold sweat, especially with chest pain or trouble breathing    · You are tired and cannot think clearly  · Your heart is beating faster than usual      · You are bleeding from your gums or nose  · You see blood in your urine or bowel movements  · You urinate less than usual or not at all  · You have new or increased swelling in your feet or ankles  © 2017 [facility] Information is for End User's use only and may not be sold, redistributed or otherwise used for commercial purposes  All illustrations and images included in CareNotes® are the copyrighted property of A D A M , Inc  or Shaquille Olvera  The above information is an  only  It is not intended as medical advice for individual conditions or treatments  Talk to your doctor, nurse or pharmacist before following any medical regimen to see if it is safe and effective for you

## 2018-06-26 NOTE — PROGRESS NOTES
Progress Note - Cardiology   Subha Ricks 56 y o  male MRN: [id number]  Unit/Bed#: ICU 07 Encounter: [id number]        Asessment/Plan:  1  Acute lateral STEMI:  S/p PCI/LIBRADO x2-ramus  No residual obstructive CAD  LVEF 45% with mid inferolateral and mid anterolateral hypokinesia  2  Brief VF arrest in the setting of acute MI:  Brief CPR x2 minutes and fibrillation  3  Dyslipidemia  4  Aortic calcification with mild stenosis-probable bicuspid valve  5  Ascending aortic root dilatation:  Per echo, 4 5 cm at sinotubular junction  (4 8 cm by CT scan)    · Patient has convalesced well and seems stable for discharge to home  · He was advised to establish himself with a cardiologist in his local area; ideally in the next 1-2 weeks  · Continue aspirin, Brilinta, and statin  Beta-blocker currently withheld in light of low normal heart rate and BP trend (/67-85)  · Long-term surveillance of valvular heart disease/aortic dilatation     Subjective:  No new complaints  Mild chest soreness related to CPR  Otherwise comfortable  Vitals:  Vitals:    06/24/18 1213 06/24/18 1451   Weight: 83 9 kg (185 lb) 88 6 kg (195 lb 5 2 oz)   ,  Vitals:    06/26/18 0800 06/26/18 1000 06/26/18 1100 06/26/18 1114   BP: 97/78 111/83 108/85    Pulse: 78 74 66    Resp: 22 (!) 34 (!) 27    Temp:    98 7 °F (37 1 °C)   TempSrc:    Temporal   SpO2: 94% 97% 96%    Weight:           Exam:  General:  Alert, normally conversant and quite comfortable appearing  Heart:  Regular with controlled rate    No rub  Lungs:  Clear bilaterally with good effort and air exchange
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
